# Patient Record
Sex: MALE | Race: BLACK OR AFRICAN AMERICAN | NOT HISPANIC OR LATINO
[De-identification: names, ages, dates, MRNs, and addresses within clinical notes are randomized per-mention and may not be internally consistent; named-entity substitution may affect disease eponyms.]

---

## 2017-12-04 ENCOUNTER — APPOINTMENT (OUTPATIENT)
Dept: UROLOGY | Facility: CLINIC | Age: 72
End: 2017-12-04
Payer: COMMERCIAL

## 2017-12-04 VITALS
HEIGHT: 71 IN | BODY MASS INDEX: 28.28 KG/M2 | WEIGHT: 202 LBS | DIASTOLIC BLOOD PRESSURE: 65 MMHG | SYSTOLIC BLOOD PRESSURE: 159 MMHG | HEART RATE: 84 BPM

## 2017-12-04 DIAGNOSIS — N40.0 BENIGN PROSTATIC HYPERPLASIA WITHOUT LOWER URINARY TRACT SYMPMS: ICD-10-CM

## 2017-12-04 PROCEDURE — 99204 OFFICE O/P NEW MOD 45 MIN: CPT

## 2017-12-04 RX ORDER — MEFLOQUINE HYDROCHLORIDE 250 MG/1
250 TABLET ORAL
Qty: 7 | Refills: 0 | Status: ACTIVE | COMMUNITY
Start: 2017-07-31

## 2017-12-04 RX ORDER — PREDNISOLONE ACETATE 10 MG/ML
1 SUSPENSION/ DROPS OPHTHALMIC
Qty: 5 | Refills: 0 | Status: ACTIVE | COMMUNITY
Start: 2017-06-23

## 2017-12-04 RX ORDER — CIPROFLOXACIN 3 MG/ML
0.3 SOLUTION OPHTHALMIC
Qty: 5 | Refills: 0 | Status: DISCONTINUED | COMMUNITY
Start: 2017-07-14

## 2017-12-04 RX ORDER — KETOROLAC TROMETHAMINE 4 MG/ML
0.4 SOLUTION/ DROPS OPHTHALMIC
Qty: 5 | Refills: 0 | Status: ACTIVE | COMMUNITY
Start: 2017-06-12

## 2017-12-04 RX ORDER — SILVER SULFADIAZINE 10 MG/G
1 CREAM TOPICAL
Qty: 400 | Refills: 0 | Status: ACTIVE | COMMUNITY
Start: 2017-06-19

## 2017-12-04 RX ORDER — CIPROFLOXACIN HYDROCHLORIDE 500 MG/1
500 TABLET, FILM COATED ORAL
Qty: 14 | Refills: 0 | Status: DISCONTINUED | COMMUNITY
Start: 2017-07-31

## 2017-12-04 RX ORDER — TRIAMTERENE AND HYDROCHLOROTHIAZIDE 37.5; 25 MG/1; MG/1
37.5-25 CAPSULE ORAL
Qty: 90 | Refills: 0 | Status: ACTIVE | COMMUNITY
Start: 2017-10-16

## 2017-12-04 RX ORDER — TRIAMTERENE AND HYDROCHLOROTHIAZIDE 25; 37.5 MG/1; MG/1
37.5-25 TABLET ORAL
Qty: 90 | Refills: 0 | Status: ACTIVE | COMMUNITY
Start: 2017-06-28

## 2017-12-04 RX ORDER — FUROSEMIDE 20 MG/1
20 TABLET ORAL
Qty: 30 | Refills: 0 | Status: ACTIVE | COMMUNITY
Start: 2017-07-03

## 2017-12-29 ENCOUNTER — APPOINTMENT (OUTPATIENT)
Dept: UROLOGY | Facility: CLINIC | Age: 72
End: 2017-12-29
Payer: COMMERCIAL

## 2017-12-29 VITALS — SYSTOLIC BLOOD PRESSURE: 152 MMHG | HEART RATE: 67 BPM | DIASTOLIC BLOOD PRESSURE: 75 MMHG | TEMPERATURE: 97.6 F

## 2017-12-29 PROCEDURE — 99214 OFFICE O/P EST MOD 30 MIN: CPT

## 2017-12-29 RX ORDER — PAPAVERINE HYDROCHLORIDE 30 MG/ML
30 INJECTION, SOLUTION INTRAVENOUS
Qty: 1 | Refills: 0 | Status: ACTIVE | COMMUNITY
Start: 2017-12-29 | End: 1900-01-01

## 2018-02-09 ENCOUNTER — APPOINTMENT (OUTPATIENT)
Dept: UROLOGY | Facility: CLINIC | Age: 73
End: 2018-02-09
Payer: COMMERCIAL

## 2018-02-09 VITALS — TEMPERATURE: 98.1 F | DIASTOLIC BLOOD PRESSURE: 83 MMHG | HEART RATE: 74 BPM | SYSTOLIC BLOOD PRESSURE: 156 MMHG

## 2018-02-09 PROCEDURE — 99214 OFFICE O/P EST MOD 30 MIN: CPT | Mod: 25

## 2018-02-09 PROCEDURE — 93980 PENILE VASCULAR STUDY: CPT

## 2018-02-09 PROCEDURE — 54235 NJX CORPORA CAVERNOSA RX AGT: CPT

## 2018-02-09 RX ORDER — TIMOLOL MALEATE 5 MG/ML
0.5 SOLUTION OPHTHALMIC
Qty: 10 | Refills: 0 | Status: ACTIVE | COMMUNITY
Start: 2018-01-15

## 2018-03-02 ENCOUNTER — APPOINTMENT (OUTPATIENT)
Dept: UROLOGY | Facility: CLINIC | Age: 73
End: 2018-03-02
Payer: COMMERCIAL

## 2018-03-02 VITALS — DIASTOLIC BLOOD PRESSURE: 70 MMHG | TEMPERATURE: 97.6 F | SYSTOLIC BLOOD PRESSURE: 133 MMHG | HEART RATE: 73 BPM

## 2018-03-02 PROCEDURE — 99214 OFFICE O/P EST MOD 30 MIN: CPT

## 2018-03-02 RX ORDER — BLOOD SUGAR DIAGNOSTIC
29G X 1/2" STRIP MISCELLANEOUS
Qty: 30 | Refills: 11 | Status: ACTIVE | COMMUNITY
Start: 2018-03-02 | End: 1900-01-01

## 2018-03-02 RX ORDER — PAPAVERINE HYDROCHLORIDE 30 MG/ML
30 INJECTION, SOLUTION INTRAVENOUS
Qty: 5 | Refills: 3 | Status: ACTIVE | COMMUNITY
Start: 2018-03-02 | End: 1900-01-01

## 2018-04-02 ENCOUNTER — APPOINTMENT (OUTPATIENT)
Dept: UROLOGY | Facility: CLINIC | Age: 73
End: 2018-04-02
Payer: COMMERCIAL

## 2018-04-02 VITALS — TEMPERATURE: 98.5 F | HEART RATE: 63 BPM | DIASTOLIC BLOOD PRESSURE: 74 MMHG | SYSTOLIC BLOOD PRESSURE: 133 MMHG

## 2018-04-02 LAB
PSA FREE FLD-MCNC: 20.2
PSA FREE SERPL-MCNC: 2.37 NG/ML
PSA SERPL-MCNC: 11.74 NG/ML

## 2018-04-02 PROCEDURE — 99213 OFFICE O/P EST LOW 20 MIN: CPT

## 2018-04-02 RX ORDER — FINASTERIDE 5 MG/1
5 TABLET, FILM COATED ORAL
Qty: 90 | Refills: 0 | Status: DISCONTINUED | COMMUNITY
Start: 2017-09-01 | End: 2018-04-02

## 2018-05-07 ENCOUNTER — APPOINTMENT (OUTPATIENT)
Dept: UROLOGY | Facility: CLINIC | Age: 73
End: 2018-05-07
Payer: COMMERCIAL

## 2018-05-07 VITALS
WEIGHT: 202 LBS | HEART RATE: 72 BPM | SYSTOLIC BLOOD PRESSURE: 148 MMHG | DIASTOLIC BLOOD PRESSURE: 80 MMHG | HEIGHT: 71 IN | TEMPERATURE: 97.9 F | BODY MASS INDEX: 28.28 KG/M2 | OXYGEN SATURATION: 98 %

## 2018-05-07 PROCEDURE — 99213 OFFICE O/P EST LOW 20 MIN: CPT

## 2018-06-08 ENCOUNTER — APPOINTMENT (OUTPATIENT)
Dept: UROLOGY | Facility: CLINIC | Age: 73
End: 2018-06-08
Payer: COMMERCIAL

## 2018-06-08 VITALS — SYSTOLIC BLOOD PRESSURE: 160 MMHG | TEMPERATURE: 98.6 F | HEART RATE: 69 BPM | DIASTOLIC BLOOD PRESSURE: 78 MMHG

## 2018-06-08 PROCEDURE — 99214 OFFICE O/P EST MOD 30 MIN: CPT | Mod: 25

## 2018-06-08 RX ORDER — CHLORHEXIDINE GLUCONATE 213 G/1000ML
4 SOLUTION TOPICAL
Qty: 1000 | Refills: 0 | Status: ACTIVE | COMMUNITY
Start: 2018-06-08 | End: 1900-01-01

## 2018-11-05 ENCOUNTER — APPOINTMENT (OUTPATIENT)
Dept: UROLOGY | Facility: CLINIC | Age: 73
End: 2018-11-05
Payer: COMMERCIAL

## 2018-11-05 VITALS
WEIGHT: 202 LBS | BODY MASS INDEX: 28.28 KG/M2 | DIASTOLIC BLOOD PRESSURE: 83 MMHG | HEIGHT: 71 IN | OXYGEN SATURATION: 97 % | HEART RATE: 64 BPM | TEMPERATURE: 98.4 F | SYSTOLIC BLOOD PRESSURE: 148 MMHG

## 2018-11-05 PROCEDURE — 99213 OFFICE O/P EST LOW 20 MIN: CPT

## 2018-11-05 RX ORDER — SULFAMETHOXAZOLE AND TRIMETHOPRIM 800; 160 MG/1; MG/1
800-160 TABLET ORAL TWICE DAILY
Qty: 18 | Refills: 0 | Status: DISCONTINUED | COMMUNITY
Start: 2018-06-08 | End: 2018-11-05

## 2018-11-07 LAB
PSA FREE FLD-MCNC: 17.6
PSA FREE SERPL-MCNC: 1.12 NG/ML
PSA SERPL-MCNC: 6.35 NG/ML

## 2019-05-13 ENCOUNTER — APPOINTMENT (OUTPATIENT)
Dept: UROLOGY | Facility: CLINIC | Age: 74
End: 2019-05-13
Payer: COMMERCIAL

## 2019-05-13 VITALS — HEART RATE: 86 BPM | DIASTOLIC BLOOD PRESSURE: 83 MMHG | TEMPERATURE: 98 F | SYSTOLIC BLOOD PRESSURE: 128 MMHG

## 2019-05-13 PROCEDURE — 99213 OFFICE O/P EST LOW 20 MIN: CPT

## 2019-05-13 NOTE — ASSESSMENT
[FreeTextEntry1] : 74yo male with long standing history of elevated PSA as high as 14, 7 prior negative biopsies. \par MRI 2016 was negative\par His repeat PSA after stopping finasteride was 11\par He would like to continue observation\par Check PSA today\par F/u 6 months

## 2019-05-13 NOTE — LETTER BODY
[Dear  ___] : Dear  [unfilled], [Courtesy Letter:] : I had the pleasure of seeing your patient, [unfilled], in my office today. [Please see my note below.] : Please see my note below. [Consult Closing:] : Thank you very much for allowing me to participate in the care of this patient.  If you have any questions, please do not hesitate to contact me. [Sincerely,] : Sincerely, [FreeTextEntry2] : Carmela Rios MD\par [address] \[place] [address] [FreeTextEntry3] : Paul Vo MD\par Urologic Oncology\par Department of Urology\par [facility]\par \par [employer] [facility] \par \par

## 2019-05-13 NOTE — HISTORY OF PRESENT ILLNESS
[FreeTextEntry1] : This is a 71yo male with complicated urological history including long standing elevated PSA for many years, BPH and erectile dysfunction. His PSA has been as high as 14 and he says he has had 7 prior prostate biopsies. He has now been on finasteride for several years and his PSA is now 5. He stopped finasteride 2 months ago because of sexual dysfunction. He has not noticed any difference in voiding symptoms or sexual function. He also takes alfuzosin although he has no significant voiding symptoms currently. He also has ED and did not respond to previous trials of PDE-5 inhibitors. He is interested in other treatment options. \par \par 4/02/18 Here for f/u. He stopped BPH medications and has managed symptoms with diet. ED managed by Dr. Portillo. \par \par 5/07/18 Here for f/u. He resumed finasteride on his own and currently satisfied with BPH symptoms. MRI was ordered at last visit but denied by insurance company. Last PSA was 11. \par \par 11/05/18 Here for f/u. No current complaints. Continues to take finasteride. No recent PSA testing. \par \par 5/13/19 Here for f/u. No current complaints. He was taking finasteride but stopped it 2 weeks ago for unclear reasons.  [Urinary Frequency] : no urinary frequency [Nocturia] : no nocturia [Straining] : no straining [Weak Stream] : no weak stream [None] : None

## 2019-05-13 NOTE — PHYSICAL EXAM
[General Appearance - Well Developed] : well developed [General Appearance - Well Nourished] : well nourished [Normal Appearance] : normal appearance [Well Groomed] : well groomed [General Appearance - In No Acute Distress] : no acute distress [Abdomen Soft] : soft [Abdomen Tenderness] : non-tender [Costovertebral Angle Tenderness] : no ~M costovertebral angle tenderness [No Prostate Nodules] : no prostate nodules [Prostate Size ___ (0-4)] : prostate size [unfilled] (scale: 0-4) [Oriented To Time, Place, And Person] : oriented to person, place, and time [Affect] : the affect was normal [Mood] : the mood was normal [Not Anxious] : not anxious [Normal Station and Gait] : the gait and station were normal for the patient's age [No Focal Deficits] : no focal deficits

## 2019-05-14 LAB
PSA FREE FLD-MCNC: 25 %
PSA FREE SERPL-MCNC: 1.83 NG/ML
PSA SERPL-MCNC: 7.47 NG/ML

## 2019-05-23 ENCOUNTER — RESULT REVIEW (OUTPATIENT)
Age: 74
End: 2019-05-23

## 2019-08-05 ENCOUNTER — APPOINTMENT (OUTPATIENT)
Dept: ORTHOPEDIC SURGERY | Facility: CLINIC | Age: 74
End: 2019-08-05
Payer: MEDICARE

## 2019-08-05 VITALS — BODY MASS INDEX: 28.28 KG/M2 | HEIGHT: 71 IN | WEIGHT: 202 LBS

## 2019-08-05 DIAGNOSIS — M19.011 PRIMARY OSTEOARTHRITIS, RIGHT SHOULDER: ICD-10-CM

## 2019-08-05 DIAGNOSIS — M75.81 OTHER SHOULDER LESIONS, RIGHT SHOULDER: ICD-10-CM

## 2019-08-05 DIAGNOSIS — M25.511 PAIN IN RIGHT SHOULDER: ICD-10-CM

## 2019-08-05 PROCEDURE — 76882 US LMTD JT/FCL EVL NVASC XTR: CPT | Mod: 59

## 2019-08-05 PROCEDURE — 99204 OFFICE O/P NEW MOD 45 MIN: CPT | Mod: 25

## 2019-08-05 PROCEDURE — 20611 DRAIN/INJ JOINT/BURSA W/US: CPT | Mod: RT

## 2019-08-05 NOTE — HISTORY OF PRESENT ILLNESS
[Pain Location] : pain [3] : an average pain level of 3/10 [Lifting] : worsened by lifting [] : right shoulder [de-identified] : RIGHT SHOULDER \par 4-5 MONTHS\par PAIN LEVEL 3-4/10\par INTERMITTENT PAIN\par WORSE WITH LIFTING, REACHING\par CLICKING\par STIFFNESS

## 2019-08-05 NOTE — PROCEDURE
[de-identified] : DIAGNOSTIC ULTRASOUND RIGHT SHOULDER\par \par DIAGNOSTIC SONOGRAPHY of the Rotator Cuff Soft Tissue of the RIGHT SHOULDER was performed in Multiple Scan Planes with varying transducer frequencies.\par Imaging of the Supraspinatus Tendon reveals INFLAMMATION AND TENDONITIS WITH OUT SIGNIFICANT TEAR\par Imaging of the Biceps Tendon reveals  INFLAMMATION AND TENDONITIS WITH OUT SIGNIFICANT TEARr.\par Imaging of the Subscapularis Tendon reveals  INFLAMMATION AND TENDONITIS WITH OUT SIGNIFICANT TEAR.\par Imaging of the Infraspinatus Tendon reveals no significant tear.\par Key images were save digitally and reviewed with patient.\par \par \par \par \par \par INJECTION RIGHT SHOULDER GH JOINT \par \par Patient has demonstrated limited relief from NSAIDS, rest, exercises / PT, and after discussion of the risks and benefits, the patient has elected to proceed with an ULTRASOUND GUIDED injection into the RIGHT GLENOHUMERAL JOINT - POSTERIOR APPROACH \par  \par Confirmed that the patient does not have history of prior adverse reactions, active, infections, or relevant allergies. There was no effusion, erythema, or warmth, and the skin was clear\par \par The skin was sterilized with alcohol. Ethyl Chloride was used as a topical anesthetic. Routine sterile technique. \par The site was injected UTILIZING ULTRASOUND GUIDANCE to confirm appropriate placement of the needle-\par with a mixture of medication and local anesthetic. The injection was completed without complication and a bandage was applied.\par  \par The patient tolerated the procedure well and was given post-injection instructions.Rec: Cold therapy, analgesics, avoid heavy activity.\par MEDICATION: 4cc of 1% xylocaine + 10mg of KENALOG\par \par

## 2019-08-05 NOTE — DISCUSSION/SUMMARY
[de-identified] : POST INJECTION INSTRUCTIONS\par \par COLD THERAPY , ANALGESICS PRN\par \par HOME STRETCHING AND EXERCISES QD\par \par START P.T.  2 WEEKS AFTER INJECTION \par \par MRI IF NO RELIEF\par

## 2019-08-05 NOTE — PHYSICAL EXAM
[de-identified] : PHYSICAL EXAM  RIGHT  SHOULDER\par SCAPULAR PROTRACTION\par AROM 115 / 110 / 60 /  0\par TENDER: GH JOINT \par \par SPECIAL TESTING :\par REY - POSITIVE \par LARRY - POSITIVE \par SPEED TEST - POSITIVE\par \par WILLS - NEGATIVE \par APPREHENSION AND SUPPRESSION - NEGATIVE \par \par RC STRENGTH TESTING \par SS:  5/5\par SUB 5/5\par IS     5/5\par BICEPS  5/5\par \par SENSATION  - GROSSLY INTACT\par \par \par

## 2019-08-21 DIAGNOSIS — R31.9 HEMATURIA, UNSPECIFIED: ICD-10-CM

## 2019-08-22 LAB
APPEARANCE: ABNORMAL
BACTERIA: NEGATIVE
BILIRUBIN URINE: NEGATIVE
BLOOD URINE: ABNORMAL
COLOR: ABNORMAL
GLUCOSE QUALITATIVE U: NEGATIVE
HYALINE CASTS: 3 /LPF
KETONES URINE: NEGATIVE
LEUKOCYTE ESTERASE URINE: NEGATIVE
MICROSCOPIC-UA: NORMAL
NITRITE URINE: NEGATIVE
PH URINE: 6
PROTEIN URINE: ABNORMAL
RED BLOOD CELLS URINE: >720 /HPF
SPECIFIC GRAVITY URINE: 1.02
SQUAMOUS EPITHELIAL CELLS: 1 /HPF
UROBILINOGEN URINE: NORMAL
WHITE BLOOD CELLS URINE: 3 /HPF

## 2019-08-23 LAB — BACTERIA UR CULT: NORMAL

## 2019-08-26 ENCOUNTER — APPOINTMENT (OUTPATIENT)
Dept: UROLOGY | Facility: CLINIC | Age: 74
End: 2019-08-26
Payer: MEDICARE

## 2019-08-26 VITALS — SYSTOLIC BLOOD PRESSURE: 109 MMHG | DIASTOLIC BLOOD PRESSURE: 71 MMHG | HEART RATE: 93 BPM | TEMPERATURE: 98.8 F

## 2019-08-26 DIAGNOSIS — N39.0 URINARY TRACT INFECTION, SITE NOT SPECIFIED: ICD-10-CM

## 2019-08-26 PROCEDURE — 52000 CYSTOURETHROSCOPY: CPT

## 2019-08-26 PROCEDURE — 51798 US URINE CAPACITY MEASURE: CPT

## 2019-08-26 PROCEDURE — 99213 OFFICE O/P EST LOW 20 MIN: CPT | Mod: 25

## 2019-08-27 LAB
APPEARANCE: CLEAR
BACTERIA: NEGATIVE
BILIRUBIN URINE: NEGATIVE
BLOOD URINE: ABNORMAL
COLOR: NORMAL
GLUCOSE QUALITATIVE U: NEGATIVE
HYALINE CASTS: 0 /LPF
KETONES URINE: NEGATIVE
LEUKOCYTE ESTERASE URINE: NEGATIVE
MICROSCOPIC-UA: NORMAL
NITRITE URINE: NEGATIVE
PH URINE: 7
PROTEIN URINE: NEGATIVE
RED BLOOD CELLS URINE: 107 /HPF
SPECIFIC GRAVITY URINE: 1.01
SQUAMOUS EPITHELIAL CELLS: 1 /HPF
UROBILINOGEN URINE: NORMAL
WHITE BLOOD CELLS URINE: 1 /HPF

## 2019-08-27 NOTE — ASSESSMENT
[FreeTextEntry1] : 75yo male with long standing history of elevated PSA as high as 14, 7 prior negative biopsies. \par MRI 2016 was negative\par His repeat PSA after stopping finasteride was 11\par New painless, gross hematuria. Reported UTI now on antibiotics\par Cystoscopy today revealed small bladder clot, enlarged prostate with large median lobe\par Repeat culture, check urine cytology\par Reviewed US report\par Recommend CT urogram

## 2019-08-27 NOTE — LETTER BODY
[Dear  ___] : Dear  [unfilled], [Courtesy Letter:] : I had the pleasure of seeing your patient, [unfilled], in my office today. [Please see my note below.] : Please see my note below. [Consult Closing:] : Thank you very much for allowing me to participate in the care of this patient.  If you have any questions, please do not hesitate to contact me. [Sincerely,] : Sincerely, [FreeTextEntry2] : Carmela Rios MD\par [address] \[place] [address] [FreeTextEntry3] : Paul Vo MD\par Urologic Oncology\par Department of Urology\par [facility]\par \par [employer] [facility] \par \par

## 2019-08-27 NOTE — PHYSICAL EXAM
[Normal Appearance] : normal appearance [General Appearance - Well Developed] : well developed [General Appearance - Well Nourished] : well nourished [Well Groomed] : well groomed [General Appearance - In No Acute Distress] : no acute distress [Abdomen Soft] : soft [Abdomen Tenderness] : non-tender [Costovertebral Angle Tenderness] : no ~M costovertebral angle tenderness [No Prostate Nodules] : no prostate nodules [Oriented To Time, Place, And Person] : oriented to person, place, and time [FreeTextEntry1] : PVR = 175cc [Prostate Size ___ (0-4)] : prostate size [unfilled] (scale: 0-4) [Affect] : the affect was normal [Mood] : the mood was normal [Not Anxious] : not anxious [Normal Station and Gait] : the gait and station were normal for the patient's age [No Focal Deficits] : no focal deficits

## 2019-08-27 NOTE — HISTORY OF PRESENT ILLNESS
[FreeTextEntry1] : This is a 71yo male with complicated urological history including long standing elevated PSA for many years, BPH and erectile dysfunction. His PSA has been as high as 14 and he says he has had 7 prior prostate biopsies. He has now been on finasteride for several years and his PSA is now 5. He stopped finasteride 2 months ago because of sexual dysfunction. He has not noticed any difference in voiding symptoms or sexual function. He also takes alfuzosin although he has no significant voiding symptoms currently. He also has ED and did not respond to previous trials of PDE-5 inhibitors. He is interested in other treatment options. \par \par 4/02/18 Here for f/u. He stopped BPH medications and has managed symptoms with diet. ED managed by Dr. Portillo. \par \par 5/07/18 Here for f/u. He resumed finasteride on his own and currently satisfied with BPH symptoms. MRI was ordered at last visit but denied by insurance company. Last PSA was 11. \par \par 11/05/18 Here for f/u. No current complaints. Continues to take finasteride. No recent PSA testing. \par \par 5/13/19 Here for f/u. No current complaints. He was taking finasteride but stopped it 2 weeks ago for unclear reasons. \par \par 8/26/19 Here for f/u. Developed painless, gross hematuria last week. Went to outside ER where he was told he had a UTI. An ultrasound was performed which revealed a questionable bladder mass versus clot. Kidneys were normal. He says he continues to have hematuria at the end of his stream. Voided urine is otherwise clear. No sensation of retention.  [Urinary Retention] : no urinary retention [Hematuria - Gross] : gross hematuria [None] : None

## 2019-08-28 ENCOUNTER — APPOINTMENT (OUTPATIENT)
Dept: UROLOGY | Facility: CLINIC | Age: 74
End: 2019-08-28
Payer: MEDICARE

## 2019-08-28 VITALS — TEMPERATURE: 98.8 F | DIASTOLIC BLOOD PRESSURE: 67 MMHG | HEART RATE: 85 BPM | SYSTOLIC BLOOD PRESSURE: 112 MMHG

## 2019-08-28 LAB
BACTERIA UR CULT: NORMAL
URINE CYTOLOGY: NORMAL

## 2019-08-28 PROCEDURE — 99213 OFFICE O/P EST LOW 20 MIN: CPT

## 2019-08-28 NOTE — LETTER BODY
[Dear  ___] : Dear  [unfilled], [Please see my note below.] : Please see my note below. [Courtesy Letter:] : I had the pleasure of seeing your patient, [unfilled], in my office today. [Consult Closing:] : Thank you very much for allowing me to participate in the care of this patient.  If you have any questions, please do not hesitate to contact me. [Sincerely,] : Sincerely, [FreeTextEntry2] : Carmela Rios MD\par [address] \[place] [address] [FreeTextEntry3] : Paul Vo MD\par Urologic Oncology\par Department of Urology\par [facility]\par \par [employer] at [facility] \par \par

## 2019-08-28 NOTE — ASSESSMENT
[FreeTextEntry1] : 73yo male with long standing history of elevated PSA as high as 14, 7 prior negative biopsies. \par MRI 2016 was negative\par His repeat PSA after stopping finasteride was 11\par New painless, gross hematuria. Reported UTI now on antibiotics\par Cystoscopy 8/26 revealed small bladder clot, enlarged prostate with large median lobe\par Awaiting culture, cytology\par Awaiting CT urogram \par Add tamsulosin\par discussed above with Daughter who is a NP\par F/u to review

## 2019-08-28 NOTE — HISTORY OF PRESENT ILLNESS
[FreeTextEntry1] : This is a 73yo male with complicated urological history including long standing elevated PSA for many years, BPH and erectile dysfunction. His PSA has been as high as 14 and he says he has had 7 prior prostate biopsies. He has now been on finasteride for several years and his PSA is now 5. He stopped finasteride 2 months ago because of sexual dysfunction. He has not noticed any difference in voiding symptoms or sexual function. He also takes alfuzosin although he has no significant voiding symptoms currently. He also has ED and did not respond to previous trials of PDE-5 inhibitors. He is interested in other treatment options. \par \par 4/02/18 Here for f/u. He stopped BPH medications and has managed symptoms with diet. ED managed by Dr. Portillo. \par \par 5/07/18 Here for f/u. He resumed finasteride on his own and currently satisfied with BPH symptoms. MRI was ordered at last visit but denied by insurance company. Last PSA was 11. \par \par 11/05/18 Here for f/u. No current complaints. Continues to take finasteride. No recent PSA testing. \par \par 5/13/19 Here for f/u. No current complaints. He was taking finasteride but stopped it 2 weeks ago for unclear reasons. \par \par 8/26/19 Here for f/u. Developed painless, gross hematuria last week. Went to outside ER where he was told he had a UTI. An ultrasound was performed which revealed a questionable bladder mass versus clot. Kidneys were normal. He says he continues to have hematuria at the end of his stream. Voided urine is otherwise clear. No sensation of retention. \par \par 8/28/19 Walk in today for gross hematuria. Had small clot this morning but urine now clear. Would like note for work because he is only allowed 2 bathroom breaks at work.  [Urinary Retention] : no urinary retention [None] : None [Hematuria - Gross] : gross hematuria

## 2019-08-28 NOTE — PHYSICAL EXAM
[General Appearance - Well Nourished] : well nourished [General Appearance - Well Developed] : well developed [Well Groomed] : well groomed [Normal Appearance] : normal appearance [General Appearance - In No Acute Distress] : no acute distress [Abdomen Soft] : soft [Abdomen Tenderness] : non-tender [Costovertebral Angle Tenderness] : no ~M costovertebral angle tenderness [Oriented To Time, Place, And Person] : oriented to person, place, and time [Affect] : the affect was normal [Mood] : the mood was normal [Not Anxious] : not anxious [Normal Station and Gait] : the gait and station were normal for the patient's age [No Focal Deficits] : no focal deficits

## 2019-09-16 ENCOUNTER — APPOINTMENT (OUTPATIENT)
Dept: UROLOGY | Facility: CLINIC | Age: 74
End: 2019-09-16
Payer: MEDICARE

## 2019-09-16 VITALS — TEMPERATURE: 98.5 F | HEART RATE: 69 BPM

## 2019-09-16 DIAGNOSIS — N50.811 RIGHT TESTICULAR PAIN: ICD-10-CM

## 2019-09-16 PROCEDURE — 99213 OFFICE O/P EST LOW 20 MIN: CPT

## 2019-09-16 NOTE — LETTER BODY
[Dear  ___] : Dear  [unfilled], [Courtesy Letter:] : I had the pleasure of seeing your patient, [unfilled], in my office today. [Please see my note below.] : Please see my note below. [Consult Closing:] : Thank you very much for allowing me to participate in the care of this patient.  If you have any questions, please do not hesitate to contact me. [Sincerely,] : Sincerely, [FreeTextEntry2] : Carmela Rios MD\par [address] \[place] [address] [FreeTextEntry3] : Paul Vo MD\par Urologic Oncology\par Department of Urology\par [facility]\par \par [employer] at [facility] \par \par

## 2019-09-16 NOTE — ASSESSMENT
[FreeTextEntry1] : 73yo male with long standing history of elevated PSA as high as 14, 7 prior negative biopsies. \par MRI 2016 was negative\par His repeat PSA after stopping finasteride was 11\par New painless, gross hematuria. Reported UTI now on antibiotics\par Cystoscopy 8/26 revealed small bladder clot, enlarged prostate with large median lobe\par Awaiting CT urogram \par Check scrotal US for small mobile mass\par F/u 11/2019 as scheduled

## 2019-09-16 NOTE — HISTORY OF PRESENT ILLNESS
[FreeTextEntry1] : This is a 71yo male with complicated urological history including long standing elevated PSA for many years, BPH and erectile dysfunction. His PSA has been as high as 14 and he says he has had 7 prior prostate biopsies. He has now been on finasteride for several years and his PSA is now 5. He stopped finasteride 2 months ago because of sexual dysfunction. He has not noticed any difference in voiding symptoms or sexual function. He also takes alfuzosin although he has no significant voiding symptoms currently. He also has ED and did not respond to previous trials of PDE-5 inhibitors. He is interested in other treatment options. \par \par 4/02/18 Here for f/u. He stopped BPH medications and has managed symptoms with diet. ED managed by Dr. Portillo. \par \par 5/07/18 Here for f/u. He resumed finasteride on his own and currently satisfied with BPH symptoms. MRI was ordered at last visit but denied by insurance company. Last PSA was 11. \par \par 11/05/18 Here for f/u. No current complaints. Continues to take finasteride. No recent PSA testing. \par \par 5/13/19 Here for f/u. No current complaints. He was taking finasteride but stopped it 2 weeks ago for unclear reasons. \par \par 8/26/19 Here for f/u. Developed painless, gross hematuria last week. Went to outside ER where he was told he had a UTI. An ultrasound was performed which revealed a questionable bladder mass versus clot. Kidneys were normal. He says he continues to have hematuria at the end of his stream. Voided urine is otherwise clear. No sensation of retention. \par \par 8/28/19 Walk in today for gross hematuria. Had small clot this morning but urine now clear. Would like note for work because he is only allowed 2 bathroom breaks at work. \par \par 9/16/19 Here for f/u. c/o right testicular pain and scrotal mass. No further hematuria.  [Urinary Retention] : no urinary retention [Hematuria - Gross] : no gross hematuria [None] : None

## 2019-09-16 NOTE — PHYSICAL EXAM
[General Appearance - Well Developed] : well developed [General Appearance - Well Nourished] : well nourished [Normal Appearance] : normal appearance [Well Groomed] : well groomed [General Appearance - In No Acute Distress] : no acute distress [Abdomen Tenderness] : non-tender [Abdomen Soft] : soft [Abdomen Hernia] : no hernia was discovered [Costovertebral Angle Tenderness] : no ~M costovertebral angle tenderness [Testes Mass (___cm)] : there were no testicular masses [Testes Tenderness] : no tenderness of the testes [FreeTextEntry1] : small 1-2cm soft mobile mass in right scrotum, probably epididymal cyst [Affect] : the affect was normal [Oriented To Time, Place, And Person] : oriented to person, place, and time [Mood] : the mood was normal [Not Anxious] : not anxious [Normal Station and Gait] : the gait and station were normal for the patient's age [No Focal Deficits] : no focal deficits

## 2019-09-22 ENCOUNTER — FORM ENCOUNTER (OUTPATIENT)
Age: 74
End: 2019-09-22

## 2019-09-23 ENCOUNTER — APPOINTMENT (OUTPATIENT)
Dept: CT IMAGING | Facility: HOSPITAL | Age: 74
End: 2019-09-23
Payer: MEDICARE

## 2019-09-23 ENCOUNTER — APPOINTMENT (OUTPATIENT)
Dept: ULTRASOUND IMAGING | Facility: HOSPITAL | Age: 74
End: 2019-09-23
Payer: MEDICARE

## 2019-09-23 ENCOUNTER — OUTPATIENT (OUTPATIENT)
Dept: OUTPATIENT SERVICES | Facility: HOSPITAL | Age: 74
LOS: 1 days | End: 2019-09-23
Payer: COMMERCIAL

## 2019-09-23 DIAGNOSIS — N50.89 OTHER SPECIFIED DISORDERS OF THE MALE GENITAL ORGANS: ICD-10-CM

## 2019-09-23 DIAGNOSIS — I86.1 SCROTAL VARICES: ICD-10-CM

## 2019-09-23 DIAGNOSIS — N32.89 OTHER SPECIFIED DISORDERS OF BLADDER: ICD-10-CM

## 2019-09-23 DIAGNOSIS — R31.9 HEMATURIA, UNSPECIFIED: ICD-10-CM

## 2019-09-23 DIAGNOSIS — N20.0 CALCULUS OF KIDNEY: ICD-10-CM

## 2019-09-23 PROCEDURE — 93975 VASCULAR STUDY: CPT

## 2019-09-23 PROCEDURE — 76870 US EXAM SCROTUM: CPT | Mod: 26

## 2019-09-23 PROCEDURE — 74178 CT ABD&PLV WO CNTR FLWD CNTR: CPT | Mod: 26

## 2019-09-23 PROCEDURE — 76870 US EXAM SCROTUM: CPT

## 2019-09-23 PROCEDURE — 93975 VASCULAR STUDY: CPT | Mod: 26

## 2019-09-23 PROCEDURE — 74178 CT ABD&PLV WO CNTR FLWD CNTR: CPT

## 2019-09-27 ENCOUNTER — RESULT REVIEW (OUTPATIENT)
Age: 74
End: 2019-09-27

## 2019-09-30 ENCOUNTER — RESULT REVIEW (OUTPATIENT)
Age: 74
End: 2019-09-30

## 2019-11-11 ENCOUNTER — APPOINTMENT (OUTPATIENT)
Dept: UROLOGY | Facility: CLINIC | Age: 74
End: 2019-11-11
Payer: COMMERCIAL

## 2019-11-11 VITALS — SYSTOLIC BLOOD PRESSURE: 114 MMHG | TEMPERATURE: 98.6 F | DIASTOLIC BLOOD PRESSURE: 68 MMHG | HEART RATE: 84 BPM

## 2019-11-11 DIAGNOSIS — N20.0 CALCULUS OF KIDNEY: ICD-10-CM

## 2019-11-11 DIAGNOSIS — N13.8 BENIGN PROSTATIC HYPERPLASIA WITH LOWER URINARY TRACT SYMPMS: ICD-10-CM

## 2019-11-11 DIAGNOSIS — R31.0 GROSS HEMATURIA: ICD-10-CM

## 2019-11-11 DIAGNOSIS — N52.9 MALE ERECTILE DYSFUNCTION, UNSPECIFIED: ICD-10-CM

## 2019-11-11 DIAGNOSIS — R97.20 ELEVATED PROSTATE, SPECIFIC ANTIGEN [PSA]: ICD-10-CM

## 2019-11-11 DIAGNOSIS — N40.1 BENIGN PROSTATIC HYPERPLASIA WITH LOWER URINARY TRACT SYMPMS: ICD-10-CM

## 2019-11-11 PROCEDURE — 99213 OFFICE O/P EST LOW 20 MIN: CPT

## 2019-11-11 NOTE — ASSESSMENT
[FreeTextEntry1] : 75yo male with long standing history of elevated PSA as high as 14, 7 prior negative biopsies. \par MRI 2016 was negative\par His repeat PSA after stopping finasteride was 11\par Hematuria evaluation in Aug/Sep 2019 showed enlarged prostate with median lobe, non obstructing 6mm left renal calculus\par Reviewed natural history of small renal calculi\par Recommend observation\par Check PSA\par F/u 6 months

## 2019-11-11 NOTE — LETTER BODY
[Dear  ___] : Dear  [unfilled], [Courtesy Letter:] : I had the pleasure of seeing your patient, [unfilled], in my office today. [Consult Closing:] : Thank you very much for allowing me to participate in the care of this patient.  If you have any questions, please do not hesitate to contact me. [Please see my note below.] : Please see my note below. [Sincerely,] : Sincerely, [FreeTextEntry2] : Carmela Rios MD\par [address] \[place] [address] [FreeTextEntry3] : Paul Vo MD\par Urologic Oncology\par Department of Urology\par [facility]\par \par [employer] [facility] \par \par

## 2019-11-11 NOTE — PHYSICAL EXAM
[General Appearance - Well Developed] : well developed [General Appearance - Well Nourished] : well nourished [Normal Appearance] : normal appearance [Well Groomed] : well groomed [General Appearance - In No Acute Distress] : no acute distress [Abdomen Soft] : soft [Abdomen Tenderness] : non-tender [Abdomen Hernia] : no hernia was discovered [Costovertebral Angle Tenderness] : no ~M costovertebral angle tenderness [Oriented To Time, Place, And Person] : oriented to person, place, and time [Affect] : the affect was normal [Not Anxious] : not anxious [Mood] : the mood was normal [Normal Station and Gait] : the gait and station were normal for the patient's age [No Focal Deficits] : no focal deficits

## 2019-11-12 LAB — PSA SERPL-MCNC: 6.58 NG/ML

## 2020-01-24 ENCOUNTER — RX RENEWAL (OUTPATIENT)
Age: 75
End: 2020-01-24

## 2020-01-30 ENCOUNTER — RX RENEWAL (OUTPATIENT)
Age: 75
End: 2020-01-30

## 2020-05-11 ENCOUNTER — APPOINTMENT (OUTPATIENT)
Dept: UROLOGY | Facility: CLINIC | Age: 75
End: 2020-05-11

## 2020-12-21 PROBLEM — N39.0 ACUTE UTI: Status: RESOLVED | Noted: 2019-08-26 | Resolved: 2020-12-21

## 2021-01-26 ENCOUNTER — RX RENEWAL (OUTPATIENT)
Age: 76
End: 2021-01-26

## 2021-01-26 RX ORDER — FINASTERIDE 5 MG/1
5 TABLET, FILM COATED ORAL DAILY
Qty: 90 | Refills: 3 | Status: ACTIVE | COMMUNITY
Start: 2018-05-07 | End: 1900-01-01

## 2021-03-15 ENCOUNTER — RX RENEWAL (OUTPATIENT)
Age: 76
End: 2021-03-15

## 2021-03-15 RX ORDER — TAMSULOSIN HYDROCHLORIDE 0.4 MG/1
0.4 CAPSULE ORAL
Qty: 90 | Refills: 3 | Status: ACTIVE | COMMUNITY
Start: 2019-08-28 | End: 1900-01-01

## 2023-05-01 NOTE — HISTORY OF PRESENT ILLNESS
Occurred 4/30  BCx NGTD  CXR normal  UA not indicative of infection  No CVC  Oropharynx clear, moist, nonerythematous  Continue cefepime, if no source at 48h will de-escalate to levofloxacin  GCSF contraindicated pending BMBx results   [FreeTextEntry1] : This is a 71yo male with complicated urological history including long standing elevated PSA for many years, BPH and erectile dysfunction. His PSA has been as high as 14 and he says he has had 7 prior prostate biopsies. He has now been on finasteride for several years and his PSA is now 5. He stopped finasteride 2 months ago because of sexual dysfunction. He has not noticed any difference in voiding symptoms or sexual function. He also takes alfuzosin although he has no significant voiding symptoms currently. He also has ED and did not respond to previous trials of PDE-5 inhibitors. He is interested in other treatment options. \par \par 4/02/18 Here for f/u. He stopped BPH medications and has managed symptoms with diet. ED managed by Dr. Portillo. \par \par 5/07/18 Here for f/u. He resumed finasteride on his own and currently satisfied with BPH symptoms. MRI was ordered at last visit but denied by insurance company. Last PSA was 11. \par \par 11/05/18 Here for f/u. No current complaints. Continues to take finasteride. No recent PSA testing. \par \par 5/13/19 Here for f/u. No current complaints. He was taking finasteride but stopped it 2 weeks ago for unclear reasons. \par \par 8/26/19 Here for f/u. Developed painless, gross hematuria last week. Went to outside ER where he was told he had a UTI. An ultrasound was performed which revealed a questionable bladder mass versus clot. Kidneys were normal. He says he continues to have hematuria at the end of his stream. Voided urine is otherwise clear. No sensation of retention. \par \par 8/28/19 Walk in today for gross hematuria. Had small clot this morning but urine now clear. Would like note for work because he is only allowed 2 bathroom breaks at work. \par \par 9/16/19 Here for f/u. c/o right testicular pain and scrotal mass. No further hematuria. \par \par 11/11/19 Here for f/u. Doing well, no recent hematuria or pain. No significant voiding complaints.  [Urinary Retention] : no urinary retention [Hematuria - Gross] : no gross hematuria [None] : None